# Patient Record
Sex: MALE | Race: BLACK OR AFRICAN AMERICAN | NOT HISPANIC OR LATINO | Employment: PART TIME | URBAN - METROPOLITAN AREA
[De-identification: names, ages, dates, MRNs, and addresses within clinical notes are randomized per-mention and may not be internally consistent; named-entity substitution may affect disease eponyms.]

---
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
## 2019-08-08 ENCOUNTER — OCCUPATIONAL HEALTH (OUTPATIENT)
Dept: URGENT CARE | Facility: CLINIC | Age: 19
End: 2019-08-08

## 2019-08-08 PROCEDURE — 80305 DRUG TEST PRSMV DIR OPT OBS: CPT | Mod: S$GLB,,, | Performed by: EMERGENCY MEDICINE

## 2019-08-08 PROCEDURE — 80305 PR COLLECTION ONLY DRUG SCREEN: ICD-10-PCS | Mod: S$GLB,,, | Performed by: EMERGENCY MEDICINE

## 2019-10-29 ENCOUNTER — HOSPITAL ENCOUNTER (EMERGENCY)
Facility: HOSPITAL | Age: 19
Discharge: HOME | End: 2019-10-29
Attending: EMERGENCY MEDICINE
Payer: MEDICAID

## 2019-10-29 VITALS
DIASTOLIC BLOOD PRESSURE: 80 MMHG | RESPIRATION RATE: 18 BRPM | HEART RATE: 72 BPM | OXYGEN SATURATION: 99 % | BODY MASS INDEX: 18.94 KG/M2 | WEIGHT: 125 LBS | SYSTOLIC BLOOD PRESSURE: 143 MMHG | TEMPERATURE: 98 F | HEIGHT: 68 IN

## 2019-10-29 DIAGNOSIS — S61.209A FLEXOR TENDON LACERATION OF FINGER WITH OPEN WOUND, INITIAL ENCOUNTER: ICD-10-CM

## 2019-10-29 DIAGNOSIS — S56.129A FLEXOR TENDON LACERATION OF FINGER WITH OPEN WOUND, INITIAL ENCOUNTER: ICD-10-CM

## 2019-10-29 DIAGNOSIS — S61.412A LACERATION OF LEFT HAND WITHOUT FOREIGN BODY, INITIAL ENCOUNTER: Primary | ICD-10-CM

## 2019-10-29 PROCEDURE — 25000003 PHARM REV CODE 250: Performed by: NURSE PRACTITIONER

## 2019-10-29 PROCEDURE — 25000003 PHARM REV CODE 250: Performed by: EMERGENCY MEDICINE

## 2019-10-29 PROCEDURE — 12031 INTMD RPR S/A/T/EXT 2.5 CM/<: CPT | Mod: F1

## 2019-10-29 PROCEDURE — 12001 RPR S/N/AX/GEN/TRNK 2.5CM/<: CPT | Mod: LT

## 2019-10-29 PROCEDURE — 63600175 PHARM REV CODE 636 W HCPCS: Performed by: EMERGENCY MEDICINE

## 2019-10-29 PROCEDURE — 96372 THER/PROPH/DIAG INJ SC/IM: CPT | Mod: 59

## 2019-10-29 PROCEDURE — 99284 EMERGENCY DEPT VISIT MOD MDM: CPT | Mod: 25

## 2019-10-29 RX ORDER — LIDOCAINE HYDROCHLORIDE 10 MG/ML
10 INJECTION, SOLUTION EPIDURAL; INFILTRATION; INTRACAUDAL; PERINEURAL
Status: DISCONTINUED | OUTPATIENT
Start: 2019-10-29 | End: 2019-10-29 | Stop reason: SDUPTHER

## 2019-10-29 RX ORDER — LIDOCAINE HYDROCHLORIDE 10 MG/ML
10 INJECTION, SOLUTION EPIDURAL; INFILTRATION; INTRACAUDAL; PERINEURAL
Status: COMPLETED | OUTPATIENT
Start: 2019-10-29 | End: 2019-10-29

## 2019-10-29 RX ORDER — CEPHALEXIN 500 MG/1
500 CAPSULE ORAL EVERY 12 HOURS
Qty: 20 CAPSULE | Refills: 0 | Status: SHIPPED | OUTPATIENT
Start: 2019-10-29 | End: 2019-11-03

## 2019-10-29 RX ORDER — CEFAZOLIN SODIUM 1 G/3ML
1 INJECTION, POWDER, FOR SOLUTION INTRAMUSCULAR; INTRAVENOUS
Status: COMPLETED | OUTPATIENT
Start: 2019-10-29 | End: 2019-10-29

## 2019-10-29 RX ORDER — BACITRACIN 500 [USP'U]/G
OINTMENT TOPICAL
Status: COMPLETED | OUTPATIENT
Start: 2019-10-29 | End: 2019-10-29

## 2019-10-29 RX ADMIN — LIDOCAINE HYDROCHLORIDE 100 MG: 10 INJECTION, SOLUTION EPIDURAL; INFILTRATION; INTRACAUDAL; PERINEURAL at 03:10

## 2019-10-29 RX ADMIN — CEFAZOLIN 1 G: 330 INJECTION, POWDER, FOR SOLUTION INTRAMUSCULAR; INTRAVENOUS at 05:10

## 2019-10-29 RX ADMIN — BACITRACIN: 500 OINTMENT TOPICAL at 04:10

## 2019-10-29 NOTE — ED PROVIDER NOTES
Encounter Date: 10/29/2019       History 19-year-old well-appearing male presents emergency department reports he was trying to clean the microwave when the dish in time was broken patient sustained a laceration to the palmar aspect of his left hand he states his last tetanus was less than 5 years ago he reports he is right-hand dominant.  Patient denies distal numbness or tingling denies decreased range of motion     Chief Complaint   Patient presents with    Laceration     HPI  Review of patient's allergies indicates:   Allergen Reactions    Shellfish containing products      No past medical history on file.  Past Surgical History:   Procedure Laterality Date    HERNIA REPAIR  2001    INGUINAL HERNIA REPAIR       Family History   Problem Relation Age of Onset    Arrhythmia Mother     Congenital heart disease Mother     Other Mother 32        ICD    Heart disease Maternal Uncle 30        Enlarged heart    Early death Maternal Grandfather 48        Heart failure     Social History     Tobacco Use    Smoking status: Never Smoker   Substance Use Topics    Alcohol use: No    Drug use: No     Review of Systems   Constitutional: Negative.    HENT: Negative.    Eyes: Negative.    Respiratory: Negative.    Cardiovascular: Negative.    Gastrointestinal: Negative.    Genitourinary: Negative.    Skin: Positive for wound.   Neurological: Negative.    Hematological: Negative.    Psychiatric/Behavioral: Negative.    All other systems reviewed and are negative.      Physical Exam     Initial Vitals [10/29/19 1539]   BP Pulse Resp Temp SpO2   (!) 164/88 78 16 97.9 °F (36.6 °C) 100 %      MAP       --         Physical Exam    Nursing note and vitals reviewed.  Constitutional: He appears well-developed and well-nourished.   Musculoskeletal: Normal range of motion. He exhibits no tenderness.   Skin:   Laceration 2 cm left palmar aspect near the 2nd metacarpal   Psychiatric: He has a normal mood and affect.         ED  Course   Lac Repair  Date/Time: 10/29/2019 5:35 PM  Performed by: NEYDA Lee  Authorized by: Kodi Jackson Jr., MD   Consent Done: Not Needed  Body area: upper extremity  Location details: left hand  Laceration length: 2 cm  Foreign bodies: no foreign bodies  Tendon involvement: superficial (partial flexor tendon laceration index finger )  Vascular damage: no  Anesthesia: local infiltration    Anesthesia:  Local anesthesia used: yes  Local Anesthetic: lidocaine 1% without epinephrine  Anesthetic total: 5 mL  Fascia closure: 4-0 Vicryl  Technique: simple  Approximation: close  Approximation difficulty: simple  Dressing: splint for protection        Labs Reviewed - No data to display       Imaging Results          X-Ray Hand 3 View Left (Final result)  Result time 10/29/19 15:58:55    Final result by Sal Ambrosio MD (10/29/19 15:58:55)                 Impression:      No acute fracture or dislocation.      Electronically signed by: Sal Ambrosio MD  Date:    10/29/2019  Time:    15:58             Narrative:    CLINICAL HISTORY:  (MRN 20 y/o  (2000) M    foreign body;    TECHNIQUE:  (A# [id number], Exam time 10/29/2019 15:58)    [id number] XR HAND COMPLETE 3 VIEW LEFT 3 view(s) obtained.    COMPARISON:  None available.    FINDINGS:  No acute fracture or dislocation. The joints and interspaces appear to be maintained.  Soft tissues appear radiographically within normal limits and no radiopaque foreign body is seen.                                 Medical Decision Making:   Initial Assessment:   Laceration left hand   Differential Diagnosis:   Skin laceration, tendon injury retained foreign body nerve injury  ED Management:  19-year-old male presents to the emergency department status post laceration that was accidental to left home over the 2nd metacarpal patient is right-hand dominant he reports he was cleaning a glass plate in the microwave that was broken.  Patient denies foreign body  sensation and there are no obvious retained foreign bodies noted on x-ray.  Patient's wound was infiltrated with lidocaine and evaluated with hemostasis patient does have a partial flexor tendon laceration of the 2nd metacarpal.  Patient was given Ancef will be discharged home with Keflex he was instructed that he must follow up with hand specialist for further evaluation.  Patient was personally evaluated by attending physician   as well.                      Clinical Impression:       ICD-10-CM ICD-9-CM   1. Laceration of left hand without foreign body, initial encounter S61.412A 882.0   2. Flexor tendon laceration of finger with open wound, initial encounter S56.129A 883.2    S61.209A                                 Oksana Amaro, NEYDA  10/29/19 6870

## 2019-10-29 NOTE — DISCHARGE INSTRUCTIONS
Keflex as directed until all gone  Motrin as directed if needed for pain and swelling  Please follow-up with the hand surgeon as directed for definitive care  Return for any concerns

## 2019-11-15 DIAGNOSIS — S66.929A HAND LACERATION INVOLVING TENDON: ICD-10-CM

## 2019-11-15 DIAGNOSIS — S61.419A HAND LACERATION INVOLVING TENDON: ICD-10-CM

## 2019-11-15 DIAGNOSIS — S66.529A: Primary | ICD-10-CM

## 2019-11-26 ENCOUNTER — CLINICAL SUPPORT (OUTPATIENT)
Dept: REHABILITATION | Facility: HOSPITAL | Age: 19
End: 2019-11-26
Payer: MEDICAID

## 2019-11-26 DIAGNOSIS — S66.922A LACERATION OF LEFT HAND INVOLVING TENDON, INITIAL ENCOUNTER: ICD-10-CM

## 2019-11-26 DIAGNOSIS — R29.898 LEFT HAND WEAKNESS: ICD-10-CM

## 2019-11-26 DIAGNOSIS — M25.442: ICD-10-CM

## 2019-11-26 DIAGNOSIS — M25.642 STIFFNESS OF LEFT HAND JOINT: ICD-10-CM

## 2019-11-26 DIAGNOSIS — M25.542 PAIN, JOINT, HAND, LEFT: Primary | ICD-10-CM

## 2019-11-26 DIAGNOSIS — S66.529A: ICD-10-CM

## 2019-11-26 DIAGNOSIS — S61.412A LACERATION OF LEFT HAND INVOLVING TENDON, INITIAL ENCOUNTER: ICD-10-CM

## 2019-11-26 PROBLEM — S66.929A HAND LACERATION INVOLVING TENDON: Status: ACTIVE | Noted: 2019-11-26

## 2019-11-26 PROBLEM — S61.419A HAND LACERATION INVOLVING TENDON: Status: ACTIVE | Noted: 2019-11-26

## 2019-11-26 PROCEDURE — 97110 THERAPEUTIC EXERCISES: CPT | Mod: PN

## 2019-11-26 PROCEDURE — 97165 OT EVAL LOW COMPLEX 30 MIN: CPT | Mod: PN

## 2019-11-26 NOTE — PLAN OF CARE
[facility] Occupational Therapy  Initial Evaluation     Date: 11/26/2019  Name: Sara Roca  Clinic Number: [id number]    Therapy Diagnosis:   Encounter Diagnoses   Name Primary?    Pain, joint, hand, left Yes    Stiffness of left hand joint     Joint effusion of hand, left     Left hand weakness     Laceration of intrinsic muscle of finger     Laceration of left hand involving tendon, initial encounter      Physician: Wong Lyn III*    Physician Orders: eval and tx  Medical Diagnosis: left IF partial fds laceration repair zone III, tenolysis fdp and fds, radial digital nerve neurolysis  Surgical Procedure and Date: see above, 11-7-19  Evaluation Date: 11/26/2019  Insurance Authorization Period Expiration: 11-15-19 thru 12-31-19  Plan of Care Certification Period: 11-26-19 thru 12-24-19  Date of Return to MD: to be determined    Visit # / Visits authorized: 1 / 12  Time In:740  Time Out: 810  Total Billable Time: 30 minutes    Precautions:  universal  Subjective     Involved Side: left  Dominant Side: right  Date of Onset: 10-29-19  History of Current Condition/Mechanism of Injury: cut hand on dish, er same day, saw juacnarlos soon after and had above surgery and recently sent here  Imaging: xray  Previous Therapy: none    Past Medical History/Physical Systems Review:   Sara Roca  has no past medical history on file.    Sara Roca  has a past surgical history that includes Hernia repair (2001) and Inguinal hernia repair.    Sara currently has no medications in their medication list.    Review of patient's allergies indicates:   Allergen Reactions    Shellfish containing products         Patient's Goals for Therapy: full painless use    Pain:  Functional Pain Scale Rating 0-10:   4/10 on average  0/10 at best  710 at worst  Location: around incision  Description: ache  Aggravating Factors: light use  Easing Factors: rest  Occupation:  [employer]   Working  presently: [employer]  Duties: Normal self and home care tasks    Functional Limitations/Social History:    Previous functional status includes: Independent with all ADLs.     Current Functional Status   Home/Living environment : lives with family    Limitation of Functional Status as follows: unable to close hand fully, sustained use limited   ADLs/IADLs:               Basic self care and work tasks limited due to pain, stiffness, weakness   Leisure: not tested  pain meds: denies  nicotine: denies  caffeine: denies    Objective   Posture:healed incision with some scabbing noted, no outward signs of infection  Palpation:nt  Sensation:denies burning, numbness, tingling  ROM: arom df/pf 40/30, rd/ud 20/20                 If              Lf               rf                 sf  mcp      0/75         0/80           0/68             0/50  Pip       0/95         0/95           0/70             0/95  Dip      0/55         0/70           0/70             0/75       Edema:circumferential     mcp left 20. 4 cm left 20.6          CMS Impairment/Limitation/Restriction for FOTO  Survey    Therapist reviewed FOTO scores for Sara Roca on 11/26/2019.   FOTO documents entered into [employer] - see Media section.    Limitation Score: 64%  Category: Carrying    Current : CL = least 60% but < 80% impaired, limited or restricted  Goal: CJ = at least 20% but < 40% impaired, limited or restricted               Treatment     Treatment Time In: 755  Treatment Time Out: 810  Total Treatment time separate from Evaluation time:15      Aumhlalitha received therapeutic exercises for 15 minutes including:          Issued HEP as below:                               current home program 11-26-19  -use gauze wrap on hand if incision area drains otherwise leave it open to air  -use hand for light painless nonrepetitive use only  -do below exercises 10 times, 6 x day; medium speed, medium force            Home Exercise Program/Education:  Issued HEP (see patient  instructions in EMR) . Exercises were reviewed and [facility] was able to demonstrate them prior to the end of the session.   Pt received a written copy of exercises to perform at home. [facility] demonstrated good understanding of the education provided.  Pt was advised to perform these exercises free of pain, and to stop performing them if pain occurs.    Patient/Family Education: role of OT, goals for OT, scheduling/cancellations - pt verbalized understanding. Discussed insurance limitations with patient.    Additional Education provided: likely tx progression, expectations of rehab, scar maturation    Assessment     Sara Roca is a 19 y.o. male referred to outpatient occupational therapy and presents with a medical diagnosis of see above, resulting in Decreased ROM, Decreased  strength, Decreased muscle strength, Decreased functional hand use, Increased pain, Edema, Joint Stiffness and Scar Adhesions and demonstrates limitations as described in the chart below. Following medical record review it is determined that pt will benefit from occupational therapy services in order to maximize pain free and/or functional use of left hand. The following goals were discussed with the patient and patient is in agreement with them as to be addressed in the treatment plan. The patient's rehab potential is good.     Anticipated barriers to occupational therapy: edema, pain, stiffness, weakness, need for education on proper rehab sequence  Pt has no cultural, educational or language barriers to learning provided.    Profile and History Assessment of Occupational Performance Level of Clinical Decision Making Complexity Score   Occupational Profile:   Sara Roca is a 19 y.o. male who lives with their family and is currently employed Sara Roca has difficulty with  ADLs and IADLs as listed previously, which  affecting his/her daily functional abilities.      Comorbidities:    has no past medical history on file.    Medical  and Therapy History Review:   Brief               Performance Deficits    Physical:  Joint Mobility  Muscle Power/Strength  Skin Integrity/Scar Formation  Edema  Pain    Cognitive:  No Deficits    Psychosocial:    No Deficits     Clinical Decision Making:  low    Assessment Process:  Problem-Focused Assessments    Modification/Need for Assistance:  Not Necessary    Intervention Selection:  Limited Treatment Options       low  Based on PMHX, co morbidities , data from assessments and functional level of assistance required with task and clinical presentation directly impacting function.           The following goals were discussed with the patient and patient is in agreement with them as to be addressed in the treatment plan.     Goals:   stg to be met in 2 weeks 1. Patient will be I with HEP 2. Patient will have 2/10 pain with light use 3. Patient will have = prom of bilateral wrist and hands  to enhance affected arm use with ADL      ltg to be met by d/c 1. Patient will be I with d/c HEP 2. Patient will have 1/10 pain with all use 3. Patient will have about 75% /pinch  on affected side vs unaffected side to promote full functional use                                                                          4. Patient will be I with all ADL        Plan   Certification Period/Plan of care expiration: 11/26/2019 to 12-24-19.    Outpatient Occupational Therapy 2 times weekly for 4 weeks to include the following interventions: eval and tx  [facility] CHT

## 2019-12-04 ENCOUNTER — TELEPHONE (OUTPATIENT)
Dept: REHABILITATION | Facility: HOSPITAL | Age: 19
End: 2019-12-04

## 2019-12-13 ENCOUNTER — CLINICAL SUPPORT (OUTPATIENT)
Dept: REHABILITATION | Facility: HOSPITAL | Age: 19
End: 2019-12-13
Payer: MEDICAID

## 2019-12-13 DIAGNOSIS — S66.922A LACERATION OF LEFT HAND INVOLVING TENDON, INITIAL ENCOUNTER: ICD-10-CM

## 2019-12-13 DIAGNOSIS — S61.412A LACERATION OF LEFT HAND INVOLVING TENDON, INITIAL ENCOUNTER: ICD-10-CM

## 2019-12-13 DIAGNOSIS — S66.529A: ICD-10-CM

## 2019-12-13 PROCEDURE — 97110 THERAPEUTIC EXERCISES: CPT | Mod: PO

## 2019-12-13 PROCEDURE — 97022 WHIRLPOOL THERAPY: CPT | Mod: PO

## 2019-12-13 NOTE — PROGRESS NOTES
Occupational Therapy Daily Treatment Note     Date: 12/13/2019  Name: Sara Roca  [facility] Number: [id number]    Therapy Diagnosis:   Encounter Diagnoses   Name Primary?    Laceration of intrinsic muscle of finger     Laceration of left hand involving tendon, initial encounter      Physician: Wong Lyn III*  Physician Orders: eval and tx  Medical Diagnosis: left IF partial fds laceration repair zone III, tenolysis fdp and fds, radial digital nerve neurolysis  Surgical Procedure and Date: see above, 11-7-19  Evaluation Date: 11/26/2019  Insurance Authorization Period Expiration: 11-15-19 thru 12-31-19  Plan of Care Certification Period: 11-26-19 thru 12-24-19  Date of Return to MD: to be determined     Visit # / Visits authorized: 2 / 12  Time In:740  Time Out: 810  Total Billable Time: 30 minutes    Subjective     Pt reports: performing HEP regularly    Pain: 0/10    Objective     Auad received the following supervised modalities after being cleared for contradictions for 15 minutes:   -Fluidotherapy    Auad received therapeutic exercises for 40 minutes including:  -WE/WF/UD/RD, circles 20  -TGE's 10  -Isospheres 3'  -Coins 1 container  -Scrunches 3'  -Peach putty molding 3'  -Juxacisor 3'      Wrist AROM  WE  WF  UD  RD    60 80 28 20     Finger ROM  WNL  Home Exercises and Education Provided     Education provided:   - Scar massage, pink sponge squeezes  - Progress towards goals     Written Home Exercises Provided: Patient instructed to cont prior HEP.  Exercises were reviewed and [facility] was able to demonstrate them prior to the end of the session.  [facility]  [facility] of the [facility] provided.   .   See EMR under Patient Instructions for exercises provided 12/13/2019.        Assessment     Pt would continue to benefit from skilled OT to maximize LUE function.     Sara is progressing well towards his goals and there are no updates to goals at this time. Pt prognosis is Good.      Pt will continue to benefit from skilled outpatient occupational therapy to address the deficits listed in the problem list on initial evaluation provide pt/family education and to maximize pt's level of independence in the home and community environment.       Pt's spiritual, cultural and educational needs considered and pt agreeable to plan of care and goals.    Goals:  stg to be met in 2 weeks 1. Patient will be I with HEP 2. Patient will have 2/10 pain with light use 3. Patient will have = prom of bilateral wrist and hands  to enhance affected arm use with ADL        ltg to be met by d/c 1. Patient will be I with d/c HEP 2. Patient will have 1/10 pain with all use 3. Patient will have about 75% /pinch  on affected side vs unaffected side to promote full functional use                                                                          4. Patient will be I with all ADL      Plan   Cont OT to address above goals.        LAINEY Jo OTR/L, CHT

## 2019-12-24 ENCOUNTER — CLINICAL SUPPORT (OUTPATIENT)
Dept: REHABILITATION | Facility: HOSPITAL | Age: 19
End: 2019-12-24
Payer: MEDICAID

## 2019-12-24 DIAGNOSIS — S61.412A LACERATION OF LEFT HAND INVOLVING TENDON, INITIAL ENCOUNTER: ICD-10-CM

## 2019-12-24 DIAGNOSIS — S66.529A: ICD-10-CM

## 2019-12-24 DIAGNOSIS — S66.922A LACERATION OF LEFT HAND INVOLVING TENDON, INITIAL ENCOUNTER: ICD-10-CM

## 2019-12-24 PROCEDURE — 97022 WHIRLPOOL THERAPY: CPT | Mod: PO

## 2019-12-24 PROCEDURE — 97110 THERAPEUTIC EXERCISES: CPT | Mod: PO

## 2019-12-24 NOTE — PROGRESS NOTES
Occupational Therapy Daily Treatment Note     Date: 12/24/2019  Name: Sara [facility] Number: [id number]    Therapy Diagnosis:   Encounter Diagnoses   Name Primary?    Laceration of intrinsic muscle of finger     Laceration of left hand involving tendon, initial encounter      Physician: Wong Lyn III*  Physician Orders: eval and tx  Medical Diagnosis: left IF partial fds laceration repair zone III, tenolysis fdp and fds, radial digital nerve neurolysis  Surgical Procedure and Date: see above, 11-7-19  Evaluation Date: 11/26/2019  Insurance Authorization Period Expiration: 11-15-19 thru 12-31-19  Plan of Care Certification Period: 11-26-19 thru 12-24-19  Date of Return to MD: to be determined     Visit # / Visits authorized: 3 / 12  Time In:10:00 AM  Time Out: 11:00 AM  Total Billable Time: 30 minutes    Subjective     Pt reports: Hypersensitivity at injury/incision site  Response to previous treatment:Kush well    Pain: 0/10    Objective   [facility] following supervised modalities after being cleared for contradictions for 15 minutes:   -Fluidotherapy     Auad received therapeutic exercises for 40 minutes including:  -WE/WF/UD/RD, circles 20  -TGE's 10  -Isospheres 3'  -Coins 1 container  -Scrunches 3'  -Peach putty molding 3', squeezes 10, 2P pinches 3 logs  -Rice bucket 3'    Finger AROM  WNL all joint planes     Strength: (TREY Dynamometer in psi)      Left Right   Rung II 55 103         Pinch Strength (Measured in psi)       Left Right   3pt Pinch 9 11   2pt Pinch 4 4          Home Exercises and Education Provided     Education provided:   - Progress towards goals     Written Home Exercises Provided: Patient instructed to cont prior HEP.  Exercises were reviewed and [facility] was able to demonstrate them prior to the end of the session.  [facility] demonstrated good  understanding of the HEP provided.   .   See EMR under Patient Instructions for exercises provided prior visit.         Assessment     Pt would continue to benefit from skilled OT to maximize LUE function.     Sara is progressing well towards his goals and there are no updates to goals at this time. Pt prognosis is Good.     Pt will continue to benefit from skilled outpatient occupational therapy to address the deficits listed in the problem list on initial evaluation provide pt/family education and to maximize pt's level of independence in the home and community environment.       Pt's spiritual, cultural and educational needs considered and pt agreeable to plan of care and goals.    Goals:  stg to be met in 2 weeks 1. Patient will be I with HEP 2. Patient will have 2/10 pain with light use 3. Patient will have = prom of bilateral wrist and hands  to enhance affected arm use with ADL        ltg to be met by d/c 1. Patient will be I with d/c HEP 2. Patient will have 1/10 pain with all use 3. Patient will have about 75% /pinch  on affected side vs unaffected side to promote full functional use                                                                          4. Patient will be I with all ADL      Plan   Cont OT to address above goals.        LAINEY Jo OTR/L, CHT

## 2019-12-27 ENCOUNTER — CLINICAL SUPPORT (OUTPATIENT)
Dept: REHABILITATION | Facility: HOSPITAL | Age: 19
End: 2019-12-27
Payer: MEDICAID

## 2019-12-27 DIAGNOSIS — S66.529A: ICD-10-CM

## 2019-12-27 DIAGNOSIS — S66.922A LACERATION OF LEFT HAND INVOLVING TENDON, INITIAL ENCOUNTER: ICD-10-CM

## 2019-12-27 DIAGNOSIS — S61.412A LACERATION OF LEFT HAND INVOLVING TENDON, INITIAL ENCOUNTER: ICD-10-CM

## 2019-12-27 PROCEDURE — 97022 WHIRLPOOL THERAPY: CPT | Mod: PO

## 2019-12-27 PROCEDURE — 97110 THERAPEUTIC EXERCISES: CPT | Mod: PO

## 2019-12-27 NOTE — PROGRESS NOTES
Occupational Therapy Daily Treatment Note     Date: 12/27/2019  Name: Sara Roca  [facility] Number: [id number]    Therapy Diagnosis:   Encounter Diagnoses   Name Primary?    Laceration of intrinsic muscle of finger     Laceration of left hand involving tendon, initial encounter      Physician: Wong Lyn III*  Physician Orders: eval and tx  Medical Diagnosis: left IF partial fds laceration repair zone III, tenolysis fdp and fds, radial digital nerve neurolysis  Surgical Procedure and Date: see above, 11-7-19  Evaluation Date: 11/26/2019  Insurance Authorization Period Expiration: 11-15-19 thru 12-31-19  Plan of Care Certification Period: 11-26-19 thru 12-24-19  Date of Return to MD: to be determined     Visit # / Visits authorized: 4 / 12  Time In:2:00 PM  Time Out: 3:00 PM  Total Billable Time: 30 minutes    Subjective     Pt reports: No new c/o  Response to previous treatment:Kush well    Pain: 0/10    Objective   Humeralalitha received the following supervised modalities after being cleared for contradictions for 15 minutes:   -Fluidotherapy     Melizaad received therapeutic exercises for 40 minutes including:  -WE/WF/UD/RD, circles 20  -TGE's 10  -Isospheres 3'  -Coins 1 container  -Scrunches 3'  -Pink putty molding 3', squeezes 10, 2P/lat pinches 3 logs  -Rice bucket 3'  -Large red flexbar WE/WF/UD/RD 3x10    Home Exercises and Education Provided     Education provided:  - Progress towards goals     Written Home Exercises Provided: Patient instructed to cont prior HEP.  Exercises were reviewed and [facility] was able to demonstrate them prior to the end of the session.  Sara demonstrated good  understanding of the HEP provided.   .   See EMR under Patient Instructions for exercises provided prior visit.        Assessment     Pt would continue to benefit from skilled OT to maximize LUE function.     Sara is progressing well towards his goals and there are no updates to goals at this time. Pt prognosis is Good.      Pt will continue to benefit from skilled outpatient occupational therapy to address the deficits listed in the problem list on initial evaluation provide pt/family education and to maximize pt's level of independence in the home and community environment.       Pt's spiritual, cultural and educational needs considered and pt agreeable to plan of care and goals.    Goals:  stg to be met in 2 weeks 1. Patient will be I with HEP 2. Patient will have 2/10 pain with light use 3. Patient will have = prom of bilateral wrist and hands  to enhance affected arm use with ADL        ltg to be met by d/c 1. Patient will be I with d/c HEP 2. Patient will have 1/10 pain with all use 3. Patient will have about 75% /pinch  on affected side vs unaffected side to promote full functional use                                                                          4. Patient will be I with all ADL      Plan   Cont OT to address above goals.        LAINEY Jo OTR/L, CHT

## 2019-12-30 ENCOUNTER — CLINICAL SUPPORT (OUTPATIENT)
Dept: REHABILITATION | Facility: HOSPITAL | Age: 19
End: 2019-12-30
Payer: MEDICAID

## 2019-12-30 DIAGNOSIS — S66.529A: ICD-10-CM

## 2019-12-30 DIAGNOSIS — S66.922A LACERATION OF LEFT HAND INVOLVING TENDON, INITIAL ENCOUNTER: ICD-10-CM

## 2019-12-30 DIAGNOSIS — S61.412A LACERATION OF LEFT HAND INVOLVING TENDON, INITIAL ENCOUNTER: ICD-10-CM

## 2019-12-30 PROCEDURE — 97110 THERAPEUTIC EXERCISES: CPT | Mod: PO

## 2019-12-30 PROCEDURE — 97022 WHIRLPOOL THERAPY: CPT | Mod: PO

## 2019-12-30 NOTE — PROGRESS NOTES
Occupational Therapy Daily Treatment Note     Date: 12/30/2019  Name: Sara Roca  [facility] Number: [id number]    Therapy Diagnosis:   Encounter Diagnoses   Name Primary?    Laceration of intrinsic muscle of finger     Laceration of left hand involving tendon, initial encounter      Physician: Wong Lyn III*    Physician Orders: eval and tx  Medical Diagnosis: left IF partial fds laceration repair zone III, tenolysis fdp and fds, radial digital nerve neurolysis  Surgical Procedure and Date: see above, 11-7-19  Evaluation Date: 11/26/2019  Insurance Authorization Period Expiration: 11-15-19 thru 12-31-19  Plan of Care Certification Period: 11-26-19 thru 12-24-19  Date of Return to MD: to be determined     Visit # / Visits authorized: 5 / 12  Time In:9:00 AM  Time Out: 10:00 AM  Total Billable Time: 30 minutes      Subjective     Pt reports: No new c/o.  Response to previous treatment:Improved  strength    Pain: 0/10    Objective   Auad received the following supervised modalities after being cleared for contradictions for 15 minutes:   -Fluidotherapy     Auad received therapeutic exercises for 40 minutes including:  -TGE's 10  -Isospheres 3'  -Coins 2 containers  -Scrunches 3'  -Pink putty molding 3', squeezes 10, 2P/lat pinches 3 logs  -Rice bucket 3'  -Large red flexbar WE/WF/UD/RD 3x10     Wrist/Finger AROM  WNL all joint planes      Strength: (TREY Dynamometer in psi)    Rung II 78 +23         Pinch Strength (Measured in psi)     3pt Pinch 8 -1   2pt Pinch 4 =          Home Exercises and Education Provided     Education provided:   - Progress towards goals     Written Home Exercises Provided: Patient instructed to cont prior HEP.  Exercises were reviewed and [facility] was able to demonstrate them prior to the end of the session.  [facility] good  understanding of the HEP provided.   .   See EMR under Patient Instructions for exercises provided prior visit.        Assessment     Pt  would continue to benefit from skilled OT to maximize LUE function.     Sara is progressing well towards his goals and there are no updates to goals at this time. Pt prognosis is Good.     Pt will continue to benefit from skilled outpatient occupational therapy to address the deficits listed in the problem list on initial evaluation provide pt/family education and to maximize pt's level of independence in the home and community environment.       Pt's spiritual, cultural and educational needs considered and pt agreeable to plan of care and goals.    Goals:  stg to be met in 2 weeks 1. Patient will be I with HEP (Met)  2. Patient will have 2/10 pain with light use (MET)  3. Patient will have = prom of bilateral wrist and hands  to enhance affected arm use with ADL (MET)        ltg to be met by d/c 1. Patient will be I with d/c HEP (PROGRESSING)  2. Patient will have 1/10 pain with all use 3. Patient will have about 75% /pinch  on affected side vs unaffected side to promote full functional use (MET)                                                                          4. Patient will be I with all ADL (PROGRESSING)     Plan   Cont OT to address above goals.        LAINEY Jo OTR/L, CHT

## 2020-01-17 ENCOUNTER — DOCUMENTATION ONLY (OUTPATIENT)
Dept: REHABILITATION | Facility: HOSPITAL | Age: 20
End: 2020-01-17

## 2020-01-17 NOTE — PROGRESS NOTES
Clinical Support     12/30/2019  [facility]   Venita Jo OT   Occupational Therapy   Laceration of intrinsic muscle of finger +1 more   Dx   Referred by Wong Lyn III, MD   Reason for Visit    Instructions      After Visit Summary (Automatic SnapShot taken 12/30/2019)   Progress Notes           Occupational Therapy Daily Treatment Note      Date: 12/30/2019  Name: Sara Roca  Clinic Number: [id number]     Therapy Diagnosis:        Encounter Diagnoses   Name Primary?    Laceration of intrinsic muscle of finger      Laceration of left hand involving tendon, initial encounter        Physician: Wong Lyn III*     Physician Orders: eval and tx  Medical Diagnosis: left IF partial fds laceration repair zone III, tenolysis fdp and fds, radial digital nerve neurolysis  Surgical Procedure and Date: see above, 11-7-19  Evaluation Date: 11/26/2019  Insurance Authorization Period Expiration: 11-15-19 thru 12-31-19  Plan of Care Certification Period: 11-26-19 thru 12-24-19  Date of Return to MD: to be determined     Visit # / Visits authorized: 5 / 12  Time In:9:00 AM  Time Out: 10:00 AM  Total Billable Time: 30 minutes        Subjective      Pt reports: No new c/o.  Response to previous treatment:Improved  strength     Pain: 0/10     Objective     Wrist/Finger AROM  WNL all joint planes      Strength: (TREY Dynamometer in psi)    Rung II 78 +23         Pinch Strength (Measured in psi)     3pt Pinch 8 -1   2pt Pinch 4 =            Home Exercises and Education Provided      Education provided:   - Progress towards goals      Written Home Exercises Provided: Patient instructed to cont prior HEP.  Exercises were reviewed and [facility] was able to demonstrate them prior to the end of the session.  [facility] demonstrated good  understanding of the HEP provided.   .   See EMR under Patient Instructions for exercises provided prior visit.         Assessment      Pt would continue to  benefit from skilled OT to maximize LUE function.      Sara is progressing well towards his goals and there are no updates to goals at this time. Pt prognosis is Good.      Pt will continue to benefit from skilled outpatient occupational therapy to address the deficits listed in the problem list on initial evaluation provide pt/family education and to maximize pt's level of independence in the home and community environment.         Pt's spiritual, cultural and educational needs considered and pt agreeable to plan of care and goals.     Goals:  stg to be met in 2 weeks 1. Patient will be I with HEP (Met)  2. Patient will have 2/10 pain with light use (MET)  3. Patient will have = prom of bilateral wrist and hands  to enhance affected arm use with ADL (MET)        ltg to be met by d/c 1. Patient will be I with d/c HEP (PROGRESSING)  2. Patient will have 1/10 pain with all use 3. Patient will have about 75% /pinch  on affected side vs unaffected side to promote full functional use (MET)                                                                          4. Patient will be I with all ADL (PROGRESSING)      Plan   Cont OT to address above goals.           LAINEY Jo OTR/L, CHT

## 2022-10-06 ENCOUNTER — APPOINTMENT (OUTPATIENT)
Dept: GENERAL RADIOLOGY | Age: 22
End: 2022-10-06
Attending: PHYSICIAN ASSISTANT

## 2022-10-06 ENCOUNTER — HOSPITAL ENCOUNTER (EMERGENCY)
Age: 22
Discharge: HOME | End: 2022-10-06
Attending: EMERGENCY MEDICINE

## 2022-10-06 VITALS
DIASTOLIC BLOOD PRESSURE: 84 MMHG | OXYGEN SATURATION: 98 % | RESPIRATION RATE: 18 BRPM | SYSTOLIC BLOOD PRESSURE: 136 MMHG | HEART RATE: 85 BPM | TEMPERATURE: 97.3 F

## 2022-10-06 DIAGNOSIS — S63.502A LEFT WRIST SPRAIN, INITIAL ENCOUNTER: ICD-10-CM
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
## 2022-10-06 PROCEDURE — 73110 X-RAY EXAM OF WRIST: CPT

## 2022-10-06 PROCEDURE — 99283 EMERGENCY DEPT VISIT LOW MDM: CPT

## 2022-10-06 RX ORDER — IBUPROFEN 600 MG/1
600 TABLET ORAL
Qty: 20 TABLET | Refills: 0 | Status: SHIPPED | OUTPATIENT
Start: 2022-10-06

## 2022-10-06 RX ORDER — CYCLOBENZAPRINE HCL 10 MG
10 TABLET ORAL
Qty: 10 TABLET | Refills: 0 | Status: SHIPPED | OUTPATIENT
Start: 2022-10-06
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
